# Patient Record
Sex: FEMALE | Race: WHITE | Employment: UNEMPLOYED | URBAN - NONMETROPOLITAN AREA
[De-identification: names, ages, dates, MRNs, and addresses within clinical notes are randomized per-mention and may not be internally consistent; named-entity substitution may affect disease eponyms.]

---

## 2024-01-23 ENCOUNTER — OFFICE VISIT (OUTPATIENT)
Dept: PRIMARY CARE | Facility: CLINIC | Age: 49
End: 2024-01-23
Payer: MEDICAID

## 2024-01-23 VITALS
SYSTOLIC BLOOD PRESSURE: 104 MMHG | WEIGHT: 140 LBS | BODY MASS INDEX: 22.5 KG/M2 | TEMPERATURE: 98.5 F | DIASTOLIC BLOOD PRESSURE: 70 MMHG | HEIGHT: 66 IN | OXYGEN SATURATION: 99 % | HEART RATE: 116 BPM

## 2024-01-23 DIAGNOSIS — F41.9 ANXIETY AND DEPRESSION: Primary | ICD-10-CM

## 2024-01-23 DIAGNOSIS — F32.A ANXIETY AND DEPRESSION: Primary | ICD-10-CM

## 2024-01-23 PROCEDURE — 99204 OFFICE O/P NEW MOD 45 MIN: CPT | Performed by: FAMILY MEDICINE

## 2024-01-23 RX ORDER — BISMUTH SUBSALICYLATE 262 MG
1 TABLET,CHEWABLE ORAL DAILY
COMMUNITY

## 2024-01-23 RX ORDER — DROSPIRENONE AND ETHINYL ESTRADIOL 0.02-3(28)
1 KIT ORAL
COMMUNITY
Start: 2023-05-25

## 2024-01-23 RX ORDER — VENLAFAXINE HYDROCHLORIDE 37.5 MG/1
37.5 CAPSULE, EXTENDED RELEASE ORAL DAILY
Qty: 30 CAPSULE | Refills: 1 | Status: SHIPPED | OUTPATIENT
Start: 2024-01-23 | End: 2024-02-21 | Stop reason: SDUPTHER

## 2024-01-23 RX ORDER — IBUPROFEN 200 MG
1 CAPSULE ORAL DAILY
COMMUNITY

## 2024-01-23 RX ORDER — BUTYROSPERMUM PARKII(SHEA BUTTER), SIMMONDSIA CHINENSIS (JOJOBA) SEED OIL, ALOE BARBADENSIS LEAF EXTRACT .01; 1; 3.5 G/100G; G/100G; G/100G
250 LIQUID TOPICAL 2 TIMES DAILY
COMMUNITY

## 2024-01-23 NOTE — PROGRESS NOTES
"Subjective   Patient ID: Mariah Cole is a 48 y.o. female who presents for Establish Care, Anxiety (Scales completed), and Depression.    HPI SCALES >10 STEFAN AND PHQ >10  SEEING GYNECOLOGY FOR SEVERE CLOTTING PERIODS AND HORMONAL THERAPY  CURRENTLY.  KNOWN FROM COMMUNITY   SEEN LAST 2018    Review of Systems   Constitutional:  Negative for activity change, appetite change, fever and unexpected weight change.   HENT:  Negative for congestion, ear pain, postnasal drip, rhinorrhea, sinus pressure and sore throat.    Eyes:  Negative for discharge, itching and visual disturbance.   Respiratory:  Negative for cough, shortness of breath and wheezing.    Cardiovascular:  Negative for chest pain, palpitations and leg swelling.   Gastrointestinal:  Negative for abdominal pain, blood in stool, constipation, diarrhea, nausea and vomiting.   Endocrine: Negative for cold intolerance, heat intolerance and polyuria.   Genitourinary:  Positive for menstrual problem. Negative for dysuria, flank pain and hematuria.        SHE IS ON VITALY FOR HEAVY CLOTTING PERIODS AND THIS REALLY HELPS   WHILE IT IS INDICATED  FOR PMS DYSPHORIC DISORDER IT COULD ALSO CAUSE DEPRESSION    Musculoskeletal:  Negative for arthralgias, gait problem, joint swelling and myalgias.   Skin:  Negative for rash.   Allergic/Immunologic: Negative for environmental allergies and food allergies.   Neurological:  Negative for dizziness, syncope, weakness, light-headedness, numbness and headaches.   Psychiatric/Behavioral:  Positive for decreased concentration, dysphoric mood and sleep disturbance. The patient is nervous/anxious.        Objective   /70   Pulse (!) 116   Temp 36.9 °C (98.5 °F)   Ht 1.664 m (5' 5.5\")   Wt 63.5 kg (140 lb)   SpO2 99%   BMI 22.94 kg/m²     Physical Exam  Vitals and nursing note reviewed.   Constitutional:       Appearance: Normal appearance.   HENT:      Head: Normocephalic.      Mouth/Throat:      Mouth: Mucous membranes are " "moist.   Cardiovascular:      Rate and Rhythm: Normal rate and regular rhythm.      Pulses: Normal pulses.      Heart sounds: Normal heart sounds. No murmur heard.     No friction rub. No gallop.   Pulmonary:      Effort: Pulmonary effort is normal. No respiratory distress.      Breath sounds: Normal breath sounds. No wheezing.   Abdominal:      General: Bowel sounds are normal. There is no distension.      Palpations: Abdomen is soft.      Tenderness: There is no abdominal tenderness.   Musculoskeletal:         General: No deformity.   Skin:     General: Skin is warm and dry.      Capillary Refill: Capillary refill takes less than 2 seconds.   Neurological:      General: No focal deficit present.      Mental Status: She is alert and oriented to person, place, and time.   Psychiatric:      Comments: SHORTLY INTO INTERVIEW AND EXAM PATIENT BEGAN TO CRY AND HAD RUSH OF SPEECH   DOES NOT KNOW IF SHE NEEDS PSYCHIATRY   KNOW THAT SHE NEEDS HELPS    IS VERY SUPPORTIVE   >10 AND BOTH STEFAN AND PHQ   WILL ACCEPT MEDICATION AND SHORT TERM FOLLOW UP   SHE SEEMS HAPPY \"TO GET THINGS OFF HER CHEST\"           Assessment/Plan   Diagnoses and all orders for this visit:  Anxiety and depression         "

## 2024-01-28 ASSESSMENT — ENCOUNTER SYMPTOMS
ABDOMINAL PAIN: 0
RHINORRHEA: 0
PALPITATIONS: 0
CONSTIPATION: 0
SINUS PRESSURE: 0
HEADACHES: 0
ACTIVITY CHANGE: 0
SORE THROAT: 0
DYSPHORIC MOOD: 1
UNEXPECTED WEIGHT CHANGE: 0
EYE ITCHING: 0
DIZZINESS: 0
HEMATURIA: 0
EYE DISCHARGE: 0
SLEEP DISTURBANCE: 1
DECREASED CONCENTRATION: 1
SHORTNESS OF BREATH: 0
APPETITE CHANGE: 0
WEAKNESS: 0
BLOOD IN STOOL: 0
JOINT SWELLING: 0
NERVOUS/ANXIOUS: 1
DIARRHEA: 0
COUGH: 0
NUMBNESS: 0
LIGHT-HEADEDNESS: 0
VOMITING: 0
NAUSEA: 0
WHEEZING: 0
DYSURIA: 0
MYALGIAS: 0
FLANK PAIN: 0
ARTHRALGIAS: 0
FEVER: 0

## 2024-02-06 ENCOUNTER — OFFICE VISIT (OUTPATIENT)
Dept: PRIMARY CARE | Facility: CLINIC | Age: 49
End: 2024-02-06
Payer: MEDICAID

## 2024-02-06 VITALS
TEMPERATURE: 98.6 F | HEART RATE: 88 BPM | WEIGHT: 139 LBS | OXYGEN SATURATION: 98 % | DIASTOLIC BLOOD PRESSURE: 60 MMHG | SYSTOLIC BLOOD PRESSURE: 112 MMHG | BODY MASS INDEX: 22.78 KG/M2

## 2024-02-06 DIAGNOSIS — F32.A ANXIETY AND DEPRESSION: Primary | ICD-10-CM

## 2024-02-06 DIAGNOSIS — F41.9 ANXIETY AND DEPRESSION: Primary | ICD-10-CM

## 2024-02-06 PROCEDURE — 99214 OFFICE O/P EST MOD 30 MIN: CPT | Performed by: FAMILY MEDICINE

## 2024-02-06 PROCEDURE — 96127 BRIEF EMOTIONAL/BEHAV ASSMT: CPT | Performed by: FAMILY MEDICINE

## 2024-02-06 NOTE — PATIENT INSTRUCTIONS
SEE IN TWO WEEKS   CONSIDER INCREASING THE MEDICATION THEN   CONSIDER TALKING ABOUT SEEING MENTAL HEALTH

## 2024-02-06 NOTE — PROGRESS NOTES
Subjective   Patient ID: Mariah Cole is a 48 y.o. female who presents for Anxiety (Med started 2 weeks ago.  She states that it has helped her sleep better and over all she does feel an improvement. ).    Anxiety  Symptoms include nervous/anxious behavior.        tolerating the medication and she is feeling some better   FEELING LESS ANGRY     Review of Systems   Psychiatric/Behavioral:  Positive for agitation, behavioral problems, dysphoric mood and sleep disturbance. The patient is nervous/anxious.         PATIENT WITH  WHO IS SUPPORTIVE  SHE SEEMS MANIC TODAY NO SO DEPRESSED OR CRYING       NO SINCE RECENT VISIT NO INTERVAL PHYSICAL CHANGES IN ANY OF THE 12 SYSTEMS REVIEWED OTHER THAN THE PSYCHIATRIC LIKE DISCUSSED     Objective   /60   Pulse 88   Temp 37 °C (98.6 °F)   Wt 63 kg (139 lb)   SpO2 98%   BMI 22.78 kg/m²     Physical Exam  Vitals and nursing note reviewed.   Constitutional:       Appearance: Normal appearance.   HENT:      Head: Normocephalic.      Mouth/Throat:      Mouth: Mucous membranes are moist.   Cardiovascular:      Rate and Rhythm: Normal rate and regular rhythm.      Pulses: Normal pulses.      Heart sounds: Normal heart sounds. No murmur heard.     No friction rub. No gallop.   Pulmonary:      Effort: Pulmonary effort is normal. No respiratory distress.      Breath sounds: Normal breath sounds. No wheezing.   Abdominal:      General: Bowel sounds are normal. There is no distension.      Palpations: Abdomen is soft.      Tenderness: There is no abdominal tenderness.   Musculoskeletal:         General: No deformity. Normal range of motion.   Skin:     General: Skin is warm and dry.      Capillary Refill: Capillary refill takes less than 2 seconds.   Neurological:      General: No focal deficit present.      Mental Status: She is alert and oriented to person, place, and time.   Psychiatric:      Comments: ANXIOUS AFFECT AND MOOD, MANIC WITH RUN ON SENTENCES AND SWITCHING  TOPICS  APOLOGIZING CONSTANTLY   WITH THIS RECENT SECOND VISIT IT IS MORE APPARENT THAT PATIENT HAS SOME DEEP SEATED ISSUES AND SHE STATES THAT SHE RUMINATES ON WHAT HAS HAPPENED IN THE PAST    HER WEIGHT IS STABLE  SHE IS SLEEPING NOW WITH THE MEDICATION   STILL DEFERS SEEING A MENTAL HEALTH PROFESSIONAL AT THIS TIME.   SHE IS NOT  SUICIDAL OR HOMICIDAL   SHE EXPRESSES AGORAPHOBIC IDEAS   STEFAN IS STIL >15 AND PHQ ALSO      SINCE RECENT VISIT NO INTERVAL PHYSICAL CHANGES IN ANY OF THE 12 SYSTEMS REVIEWED OTHER THAN PSYCHIATRIC LIKE DISCUSSED     Assessment/Plan   Diagnoses and all orders for this visit:  Anxiety and depression

## 2024-02-08 ASSESSMENT — ENCOUNTER SYMPTOMS
DYSPHORIC MOOD: 1
AGITATION: 1
SLEEP DISTURBANCE: 1
NERVOUS/ANXIOUS: 1

## 2024-02-21 ENCOUNTER — OFFICE VISIT (OUTPATIENT)
Dept: PRIMARY CARE | Facility: CLINIC | Age: 49
End: 2024-02-21
Payer: MEDICAID

## 2024-02-21 VITALS
BODY MASS INDEX: 23.27 KG/M2 | DIASTOLIC BLOOD PRESSURE: 60 MMHG | SYSTOLIC BLOOD PRESSURE: 110 MMHG | HEART RATE: 100 BPM | OXYGEN SATURATION: 98 % | TEMPERATURE: 98.1 F | WEIGHT: 142 LBS

## 2024-02-21 DIAGNOSIS — F41.9 ANXIETY AND DEPRESSION: ICD-10-CM

## 2024-02-21 DIAGNOSIS — F32.A ANXIETY AND DEPRESSION: ICD-10-CM

## 2024-02-21 PROCEDURE — 99214 OFFICE O/P EST MOD 30 MIN: CPT | Performed by: FAMILY MEDICINE

## 2024-02-21 PROCEDURE — 96127 BRIEF EMOTIONAL/BEHAV ASSMT: CPT | Performed by: FAMILY MEDICINE

## 2024-02-21 RX ORDER — VENLAFAXINE HYDROCHLORIDE 75 MG/1
75 CAPSULE, EXTENDED RELEASE ORAL DAILY
Qty: 30 CAPSULE | Refills: 3 | Status: SHIPPED | OUTPATIENT
Start: 2024-02-21 | End: 2024-05-22

## 2024-02-21 NOTE — PROGRESS NOTES
Subjective   Patient ID: Mariah Cole is a 48 y.o. female who presents for Anxiety (Scales completed.  Mariah states she is feeling a lot better.).    Anxiety  Symptoms include nervous/anxious behavior. Patient reports no chest pain, dizziness, nausea, palpitations or shortness of breath.            Review of Systems   Constitutional:  Negative for activity change, appetite change, fever and unexpected weight change.   HENT:  Negative for congestion, ear pain, postnasal drip, rhinorrhea, sinus pressure and sore throat.    Eyes:  Negative for discharge, itching and visual disturbance.   Respiratory:  Negative for cough, shortness of breath and wheezing.    Cardiovascular:  Negative for chest pain, palpitations and leg swelling.   Gastrointestinal:  Positive for constipation. Negative for abdominal pain, blood in stool, diarrhea, nausea and vomiting.        SINCE THE MEDICATION    Endocrine: Negative for cold intolerance, heat intolerance and polyuria.   Genitourinary:  Negative for dysuria, flank pain and hematuria.   Musculoskeletal:  Negative for arthralgias, gait problem, joint swelling and myalgias.   Skin:  Negative for rash.   Allergic/Immunologic: Negative for environmental allergies and food allergies.   Neurological:  Negative for dizziness, syncope, weakness, light-headedness, numbness and headaches.   Psychiatric/Behavioral:  Negative for dysphoric mood and sleep disturbance. The patient is nervous/anxious.        Objective   /60   Pulse 100   Temp 36.7 °C (98.1 °F)   Wt 64.4 kg (142 lb)   SpO2 98%   BMI 23.27 kg/m²     Physical Exam  Vitals and nursing note reviewed.   Constitutional:       Appearance: Normal appearance.   HENT:      Head: Normocephalic.      Mouth/Throat:      Mouth: Mucous membranes are moist.   Cardiovascular:      Rate and Rhythm: Normal rate and regular rhythm.      Pulses: Normal pulses.      Heart sounds: Normal heart sounds. No murmur heard.     No friction rub. No  gallop.   Pulmonary:      Effort: Pulmonary effort is normal. No respiratory distress.      Breath sounds: Normal breath sounds. No wheezing.   Abdominal:      General: Bowel sounds are normal. There is no distension.      Palpations: Abdomen is soft.      Tenderness: There is no abdominal tenderness.   Musculoskeletal:         General: No deformity. Normal range of motion.   Skin:     General: Skin is warm and dry.      Capillary Refill: Capillary refill takes less than 2 seconds.   Neurological:      General: No focal deficit present.      Mental Status: She is alert and oriented to person, place, and time.   Psychiatric:      Comments: 9 STEFAN AND PHQ IS 5 OR SO  PATIENT MORE CONVERSIVE AND IN FULL SENTENCES WITH ONLY A FEW OUTBURSTS SHE DID NOT CRY   STILL DOES NOT WANT TO SEE A COUNSELOR   HER GROWN DAUGHTER IS HOME AND SPENDING TIME WITH HER          Assessment/Plan   VDiagnoses and all orders for this visit:  Anxiety and depression  -     venlafaxine XR (Effexor-XR) 75 mg 24 hr capsule; Take 1 capsule (75 mg) by mouth once daily. Do not crush or chew.

## 2024-02-22 ASSESSMENT — ENCOUNTER SYMPTOMS
FEVER: 0
JOINT SWELLING: 0
ABDOMINAL PAIN: 0
WEAKNESS: 0
DYSURIA: 0
WHEEZING: 0
NAUSEA: 0
DYSPHORIC MOOD: 0
HEADACHES: 0
DIARRHEA: 0
BLOOD IN STOOL: 0
ARTHRALGIAS: 0
EYE DISCHARGE: 0
NERVOUS/ANXIOUS: 1
FLANK PAIN: 0
EYE ITCHING: 0
PALPITATIONS: 0
COUGH: 0
APPETITE CHANGE: 0
HEMATURIA: 0
SINUS PRESSURE: 0
SLEEP DISTURBANCE: 0
ACTIVITY CHANGE: 0
SHORTNESS OF BREATH: 0
VOMITING: 0
SORE THROAT: 0
UNEXPECTED WEIGHT CHANGE: 0
DIZZINESS: 0
RHINORRHEA: 0
LIGHT-HEADEDNESS: 0
NUMBNESS: 0
MYALGIAS: 0
CONSTIPATION: 1

## 2024-03-19 ENCOUNTER — OFFICE VISIT (OUTPATIENT)
Dept: PRIMARY CARE | Facility: CLINIC | Age: 49
End: 2024-03-19
Payer: MEDICAID

## 2024-03-19 VITALS
OXYGEN SATURATION: 99 % | BODY MASS INDEX: 23.76 KG/M2 | TEMPERATURE: 97.9 F | HEART RATE: 115 BPM | WEIGHT: 145 LBS | DIASTOLIC BLOOD PRESSURE: 60 MMHG | SYSTOLIC BLOOD PRESSURE: 108 MMHG

## 2024-03-19 DIAGNOSIS — F32.A ANXIETY AND DEPRESSION: Primary | ICD-10-CM

## 2024-03-19 DIAGNOSIS — Z72.820 POOR SLEEP: ICD-10-CM

## 2024-03-19 DIAGNOSIS — F41.9 ANXIETY AND DEPRESSION: Primary | ICD-10-CM

## 2024-03-19 PROCEDURE — 99214 OFFICE O/P EST MOD 30 MIN: CPT | Performed by: FAMILY MEDICINE

## 2024-03-19 PROCEDURE — 96127 BRIEF EMOTIONAL/BEHAV ASSMT: CPT | Performed by: FAMILY MEDICINE

## 2024-03-19 ASSESSMENT — ENCOUNTER SYMPTOMS
RHINORRHEA: 0
DYSPHORIC MOOD: 1
SINUS PRESSURE: 0
JOINT SWELLING: 0
CONSTIPATION: 0
NAUSEA: 0
PALPITATIONS: 0
SHORTNESS OF BREATH: 0
DECREASED CONCENTRATION: 1
HEMATURIA: 0
LIGHT-HEADEDNESS: 0
APPETITE CHANGE: 0
ABDOMINAL PAIN: 0
DIARRHEA: 0
WHEEZING: 0
COUGH: 0
UNEXPECTED WEIGHT CHANGE: 0
NERVOUS/ANXIOUS: 0
MYALGIAS: 0
DYSURIA: 0
HEADACHES: 0
NUMBNESS: 0
SORE THROAT: 0
VOMITING: 0
EYE DISCHARGE: 0
ARTHRALGIAS: 0
DIZZINESS: 0
BLOOD IN STOOL: 0
FEVER: 0
FLANK PAIN: 0
SLEEP DISTURBANCE: 0
EYE ITCHING: 0
WEAKNESS: 0
ACTIVITY CHANGE: 0

## 2024-03-19 NOTE — PROGRESS NOTES
Subjective   Patient ID: Mariah Cole is a 48 y.o. female who presents for Depression (Scales completed) and Anxiety.    HPI POOR SLEEP   SAW GYNECOLOGY WHO DID NOT WANT TO START ESTROGEN     Review of Systems   Constitutional:  Negative for activity change, appetite change, fever and unexpected weight change.   HENT:  Negative for congestion, ear pain, postnasal drip, rhinorrhea, sinus pressure and sore throat.    Eyes:  Negative for discharge, itching and visual disturbance.   Respiratory:  Negative for cough, shortness of breath and wheezing.    Cardiovascular:  Negative for chest pain, palpitations and leg swelling.   Gastrointestinal:  Negative for abdominal pain, blood in stool, constipation, diarrhea, nausea and vomiting.   Endocrine: Negative for cold intolerance, heat intolerance and polyuria.        Night sweats and hot flashes    Genitourinary:  Negative for dysuria, flank pain and hematuria.   Musculoskeletal:  Negative for arthralgias, gait problem, joint swelling and myalgias.   Skin:  Negative for rash.   Allergic/Immunologic: Negative for environmental allergies and food allergies.   Neurological:  Negative for dizziness, syncope, weakness, light-headedness, numbness and headaches.   Psychiatric/Behavioral:  Positive for decreased concentration and dysphoric mood. Negative for sleep disturbance. The patient is not nervous/anxious.         SOME HYPERACTIVITY   RUSH OF SPEECH        Objective   /60   Pulse (!) 115   Temp 36.6 °C (97.9 °F)   Wt 65.8 kg (145 lb)   SpO2 99%   BMI 23.76 kg/m²     Physical Exam  Vitals and nursing note reviewed.   Constitutional:       Appearance: Normal appearance.   HENT:      Head: Normocephalic.      Mouth/Throat:      Mouth: Mucous membranes are moist.   Cardiovascular:      Rate and Rhythm: Normal rate and regular rhythm.      Pulses: Normal pulses.      Heart sounds: Normal heart sounds. No murmur heard.     No friction rub. No gallop.   Pulmonary:       Effort: Pulmonary effort is normal. No respiratory distress.      Breath sounds: Normal breath sounds. No wheezing.   Abdominal:      General: Bowel sounds are normal. There is no distension.      Palpations: Abdomen is soft.      Tenderness: There is no abdominal tenderness.   Musculoskeletal:         General: No deformity. Normal range of motion.   Skin:     General: Skin is warm and dry.      Capillary Refill: Capillary refill takes less than 2 seconds.   Neurological:      General: No focal deficit present.      Mental Status: She is alert and oriented to person, place, and time.   Psychiatric:         Mood and Affect: Mood normal.      Comments: STEFAN IS 7  PHQ IS 9   PATIENT FEELS BETTER AND NOT TEARFUL TODAY  SOME PSYCHOMOTOR HYPERACTIVITY        Assessment/Plan   Diagnoses and all orders for this visit:  Anxiety and depression  Poor sleep

## 2024-03-19 NOTE — PATIENT INSTRUCTIONS
BL[facility]OS[facility]  SOY NUTS AND TOFU   THESE HAVE NATURAL ESTROGENS     CONTINUE THE CHIROPRACTIC CARE

## 2024-03-22 ENCOUNTER — APPOINTMENT (OUTPATIENT)
Dept: PRIMARY CARE | Facility: CLINIC | Age: 49
End: 2024-03-22
Payer: MEDICAID

## 2024-04-30 ENCOUNTER — OFFICE VISIT (OUTPATIENT)
Dept: PRIMARY CARE | Facility: CLINIC | Age: 49
End: 2024-04-30
Payer: MEDICAID

## 2024-04-30 VITALS
BODY MASS INDEX: 23.93 KG/M2 | SYSTOLIC BLOOD PRESSURE: 114 MMHG | OXYGEN SATURATION: 99 % | TEMPERATURE: 98.9 F | WEIGHT: 146 LBS | DIASTOLIC BLOOD PRESSURE: 62 MMHG | HEART RATE: 103 BPM

## 2024-04-30 DIAGNOSIS — F41.9 ANXIETY AND DEPRESSION: Primary | ICD-10-CM

## 2024-04-30 DIAGNOSIS — F32.A ANXIETY AND DEPRESSION: Primary | ICD-10-CM

## 2024-04-30 DIAGNOSIS — Z72.820 POOR SLEEP: ICD-10-CM

## 2024-04-30 PROCEDURE — 99214 OFFICE O/P EST MOD 30 MIN: CPT | Performed by: FAMILY MEDICINE

## 2024-04-30 RX ORDER — DIVALPROEX SODIUM 250 MG/1
250 TABLET, DELAYED RELEASE ORAL NIGHTLY
Qty: 90 TABLET | Refills: 3 | Status: SHIPPED | OUTPATIENT
Start: 2024-04-30 | End: 2024-05-29 | Stop reason: WASHOUT

## 2024-04-30 ASSESSMENT — ENCOUNTER SYMPTOMS
DIARRHEA: 0
EYE ITCHING: 0
BLOOD IN STOOL: 0
UNEXPECTED WEIGHT CHANGE: 0
DYSPHORIC MOOD: 1
HEMATURIA: 0
ABDOMINAL PAIN: 0
HEADACHES: 0
NERVOUS/ANXIOUS: 1
FEVER: 0
FLANK PAIN: 0
APPETITE CHANGE: 0
SLEEP DISTURBANCE: 1
MYALGIAS: 0
VOMITING: 0
DIZZINESS: 0
LIGHT-HEADEDNESS: 0
DECREASED CONCENTRATION: 1
SORE THROAT: 0
RHINORRHEA: 0
AGITATION: 1
COUGH: 0
PALPITATIONS: 0
ARTHRALGIAS: 0
NAUSEA: 0
WEAKNESS: 0
JOINT SWELLING: 0
NUMBNESS: 0
DYSURIA: 0
SINUS PRESSURE: 0
WHEEZING: 0
SHORTNESS OF BREATH: 0
ACTIVITY CHANGE: 0
CONSTIPATION: 0
EYE DISCHARGE: 0

## 2024-04-30 NOTE — PROGRESS NOTES
Subjective   Patient ID: Mariah Cole is a 49 y.o. female who presents for Anxiety (Scales completed).    HPI PRIOR ASSESSMENT STEFAN IS 7 AND PHQ IS 9  TODAY   DEPRESSION SCALE IS UP 10  STEFAN IS STILL 7   SLEEP IS NOT GOOD     Review of Systems   Constitutional:  Negative for activity change, appetite change, fever and unexpected weight change.   HENT:  Negative for congestion, ear pain, postnasal drip, rhinorrhea, sinus pressure and sore throat.    Eyes:  Negative for discharge, itching and visual disturbance.   Respiratory:  Negative for cough, shortness of breath and wheezing.    Cardiovascular:  Negative for chest pain, palpitations and leg swelling.   Gastrointestinal:  Negative for abdominal pain, blood in stool, constipation, diarrhea, nausea and vomiting.   Endocrine: Negative for cold intolerance, heat intolerance and polyuria.   Genitourinary:  Negative for dysuria, flank pain and hematuria.        NO LIBIDO   PERIODS ARE BACK TO NORMAL ON THE BCP   STOOLS BACK TO NORMAL    Musculoskeletal:  Negative for arthralgias, gait problem, joint swelling and myalgias.   Skin:  Negative for rash.   Allergic/Immunologic: Negative for environmental allergies and food allergies.   Neurological:  Negative for dizziness, syncope, weakness, light-headedness, numbness and headaches.   Psychiatric/Behavioral:  Positive for agitation, decreased concentration, dysphoric mood and sleep disturbance. The patient is nervous/anxious.        Objective   /62   Pulse 103   Temp 37.2 °C (98.9 °F)   Wt 66.2 kg (146 lb)   SpO2 99%   BMI 23.93 kg/m²     Physical Exam  Vitals and nursing note reviewed.   Constitutional:       Appearance: Normal appearance.   HENT:      Head: Normocephalic.      Mouth/Throat:      Mouth: Mucous membranes are moist.   Cardiovascular:      Rate and Rhythm: Normal rate and regular rhythm.      Pulses: Normal pulses.      Heart sounds: Normal heart sounds. No murmur heard.     No friction rub. No  gallop.   Pulmonary:      Effort: Pulmonary effort is normal. No respiratory distress.      Breath sounds: Normal breath sounds. No wheezing.   Abdominal:      General: Bowel sounds are normal. There is no distension.      Palpations: Abdomen is soft.      Tenderness: There is no abdominal tenderness.   Genitourinary:     Comments: NIGHT TIME HOT FLASHES   Musculoskeletal:         General: No deformity. Normal range of motion.   Skin:     General: Skin is warm and dry.      Capillary Refill: Capillary refill takes less than 2 seconds.   Neurological:      General: No focal deficit present.      Mental Status: She is alert and oriented to person, place, and time.   Psychiatric:      Comments: STEFAN IS 7   PHQ IS 10   VERY ANIMATED            Assessment/Plan   Diagnoses and all orders for this visit:  Anxiety and depression  -     divalproex (Depakote) 250 mg EC tablet; Take 1 tablet (250 mg) by mouth once daily at bedtime. Do not crush, chew, or split.  Poor sleep  -     divalproex (Depakote) 250 mg EC tablet; Take 1 tablet (250 mg) by mouth once daily at bedtime. Do not crush, chew, or split.

## 2024-05-21 DIAGNOSIS — F41.9 ANXIETY AND DEPRESSION: ICD-10-CM

## 2024-05-21 DIAGNOSIS — F32.A ANXIETY AND DEPRESSION: ICD-10-CM

## 2024-05-22 RX ORDER — VENLAFAXINE HYDROCHLORIDE 75 MG/1
75 CAPSULE, EXTENDED RELEASE ORAL DAILY
Qty: 30 CAPSULE | Refills: 3 | Status: SHIPPED | OUTPATIENT
Start: 2024-05-22 | End: 2024-09-19

## 2024-05-29 ENCOUNTER — OFFICE VISIT (OUTPATIENT)
Dept: PRIMARY CARE | Facility: CLINIC | Age: 49
End: 2024-05-29
Payer: MEDICAID

## 2024-05-29 VITALS
OXYGEN SATURATION: 100 % | SYSTOLIC BLOOD PRESSURE: 126 MMHG | HEART RATE: 61 BPM | WEIGHT: 148 LBS | BODY MASS INDEX: 24.25 KG/M2 | DIASTOLIC BLOOD PRESSURE: 70 MMHG | TEMPERATURE: 96.5 F

## 2024-05-29 DIAGNOSIS — F90.2 ATTENTION DEFICIT HYPERACTIVITY DISORDER (ADHD), COMBINED TYPE: Primary | ICD-10-CM

## 2024-05-29 DIAGNOSIS — F41.9 ANXIETY AND DEPRESSION: ICD-10-CM

## 2024-05-29 DIAGNOSIS — N95.8 OTHER SPECIFIED MENOPAUSAL AND PERIMENOPAUSAL DISORDERS: ICD-10-CM

## 2024-05-29 DIAGNOSIS — Z72.820 POOR SLEEP: ICD-10-CM

## 2024-05-29 DIAGNOSIS — F32.A ANXIETY AND DEPRESSION: ICD-10-CM

## 2024-05-29 PROCEDURE — 99214 OFFICE O/P EST MOD 30 MIN: CPT | Performed by: FAMILY MEDICINE

## 2024-05-29 RX ORDER — BUPROPION HYDROCHLORIDE 150 MG/1
150 TABLET ORAL EVERY MORNING
Qty: 30 TABLET | Refills: 5 | Status: SHIPPED | OUTPATIENT
Start: 2024-05-29 | End: 2024-11-25

## 2024-05-29 ASSESSMENT — ENCOUNTER SYMPTOMS
PALPITATIONS: 0
ACTIVITY CHANGE: 0
UNEXPECTED WEIGHT CHANGE: 0
FLANK PAIN: 0
LIGHT-HEADEDNESS: 0
SORE THROAT: 0
NUMBNESS: 0
BLOOD IN STOOL: 0
NAUSEA: 0
ARTHRALGIAS: 0
DIARRHEA: 0
VOMITING: 0
JOINT SWELLING: 0
RHINORRHEA: 0
WHEEZING: 0
SHORTNESS OF BREATH: 0
SLEEP DISTURBANCE: 1
CONSTIPATION: 0
DIZZINESS: 0
MYALGIAS: 0
ABDOMINAL PAIN: 0
NERVOUS/ANXIOUS: 1
HEADACHES: 0
FEVER: 0
COUGH: 0
WEAKNESS: 0
DYSURIA: 0
DYSPHORIC MOOD: 1
SINUS PRESSURE: 0
EYE ITCHING: 0
HEMATURIA: 0
EYE DISCHARGE: 0
APPETITE CHANGE: 0

## 2024-05-29 NOTE — PROGRESS NOTES
Subjective   Patient ID: Mariah Cole is a 49 y.o. female who presents for Follow-up (Sleep- better./Would like to discuss hormones, ADHD, and other medications: Wellbutrin and Mirtazapine.).    HPI PATIENT HAVING DIFFICULT TIME THROUGH HER MENOPAUSAL YEARS/DRENCHING NIGHT SWEATS AND HOT FLASHES   IS ON HORMONAL THERAPY   FOLLOWS WITH GYNECOLOGY /LAST VISIT IN 3-24 AND SHE RECOMMENDED AT LEAST 150 OF THE EFFEXOR   APPEARS TO BE SLEEPING BETTER   LABS WERE DONE EARLIER AND ARE GOOD /THYROID AND CBC   5 YEARS AGO SOME HORMONAL VALUES WERE DONE AND WERE PRE MENOPAUSAL LEVELS FSH AND LH AND ESTRADIOL  I SAW HER THEN WITH VOMITING OF NOTE DUB AND THAT'S WHY HORMONAL LEVELS WERE DONE    DID NOT TAKE THE DEPAKOTE     Review of Systems   Constitutional:  Negative for activity change, appetite change, fever and unexpected weight change.   HENT:  Negative for congestion, ear pain, postnasal drip, rhinorrhea, sinus pressure and sore throat.    Eyes:  Negative for discharge, itching and visual disturbance.   Respiratory:  Negative for cough, shortness of breath and wheezing.    Cardiovascular:  Negative for chest pain, palpitations and leg swelling.   Gastrointestinal:  Negative for abdominal pain, blood in stool, constipation, diarrhea, nausea and vomiting.   Endocrine: Negative for cold intolerance, heat intolerance and polyuria.   Genitourinary:  Negative for dysuria, flank pain and hematuria.   Musculoskeletal:  Negative for arthralgias, gait problem, joint swelling and myalgias.   Skin:  Negative for rash.   Allergic/Immunologic: Negative for environmental allergies and food allergies.   Neurological:  Negative for dizziness, syncope, weakness, light-headedness, numbness and headaches.   Psychiatric/Behavioral:  Positive for dysphoric mood and sleep disturbance. The patient is nervous/anxious.         FEELS GOOD WITH THE EFFEXOR DOSE   MAYBE ADD REMERON OR WELLBUTRIN        Objective   /70   Pulse 61   Temp 35.8  °C (96.5 °F)   Wt 67.1 kg (148 lb)   SpO2 100%   BMI 24.25 kg/m²     Physical Exam  Vitals and nursing note reviewed.   Constitutional:       Appearance: Normal appearance.   HENT:      Head: Normocephalic.      Mouth/Throat:      Mouth: Mucous membranes are moist.   Cardiovascular:      Rate and Rhythm: Normal rate and regular rhythm.      Pulses: Normal pulses.      Heart sounds: Normal heart sounds. No murmur heard.     No friction rub. No gallop.   Pulmonary:      Effort: Pulmonary effort is normal. No respiratory distress.      Breath sounds: Normal breath sounds. No wheezing.   Abdominal:      General: Bowel sounds are normal. There is no distension.      Palpations: Abdomen is soft.      Tenderness: There is no abdominal tenderness.   Musculoskeletal:         General: No deformity. Normal range of motion.   Skin:     General: Skin is warm and dry.      Capillary Refill: Capillary refill takes less than 2 seconds.   Neurological:      General: No focal deficit present.      Mental Status: She is alert and oriented to person, place, and time.      Comments: FEELS LIKE HAS ADHD   INABILITY TO FOCUS    Psychiatric:         Mood and Affect: Mood normal.      Comments: PATIENT WITH RUSH AND PRESSURED SPEECH   HYPERACTIVE          Assessment/Plan   Problem List Items Addressed This Visit             ICD-10-CM    Anxiety and depression F41.9, F32.A    Poor sleep Z72.820    Attention deficit hyperactivity disorder (ADHD), combined type - Primary F90.2    Relevant Medications    buPROPion XL (Wellbutrin XL) 150 mg 24 hr tablet    Other specified menopausal and perimenopausal disorders N95.8

## 2024-06-26 ENCOUNTER — APPOINTMENT (OUTPATIENT)
Dept: PRIMARY CARE | Facility: CLINIC | Age: 49
End: 2024-06-26
Payer: MEDICAID

## 2024-06-26 VITALS
OXYGEN SATURATION: 98 % | BODY MASS INDEX: 24.25 KG/M2 | HEART RATE: 81 BPM | WEIGHT: 148 LBS | SYSTOLIC BLOOD PRESSURE: 114 MMHG | TEMPERATURE: 97.6 F | DIASTOLIC BLOOD PRESSURE: 62 MMHG

## 2024-06-26 DIAGNOSIS — F41.9 ANXIETY AND DEPRESSION: ICD-10-CM

## 2024-06-26 DIAGNOSIS — F90.2 ATTENTION DEFICIT HYPERACTIVITY DISORDER (ADHD), COMBINED TYPE: Primary | ICD-10-CM

## 2024-06-26 DIAGNOSIS — Z72.820 POOR SLEEP: ICD-10-CM

## 2024-06-26 DIAGNOSIS — F32.A ANXIETY AND DEPRESSION: ICD-10-CM

## 2024-06-26 PROCEDURE — 99214 OFFICE O/P EST MOD 30 MIN: CPT | Performed by: FAMILY MEDICINE

## 2024-06-26 ASSESSMENT — ENCOUNTER SYMPTOMS
UNEXPECTED WEIGHT CHANGE: 0
SHORTNESS OF BREATH: 0
EYE DISCHARGE: 0
MYALGIAS: 0
ABDOMINAL PAIN: 0
DIZZINESS: 0
NUMBNESS: 0
PALPITATIONS: 0
DIARRHEA: 0
RHINORRHEA: 0
LIGHT-HEADEDNESS: 0
SINUS PRESSURE: 0
WEAKNESS: 0
DYSURIA: 0
COUGH: 0
ACTIVITY CHANGE: 0
JOINT SWELLING: 0
HEMATURIA: 0
FEVER: 0
HEADACHES: 0
BLOOD IN STOOL: 0
VOMITING: 0
ARTHRALGIAS: 0
DYSPHORIC MOOD: 0
WHEEZING: 0
NERVOUS/ANXIOUS: 1
NAUSEA: 0
FLANK PAIN: 0
SLEEP DISTURBANCE: 1
CONSTIPATION: 0
SORE THROAT: 0
EYE ITCHING: 0
APPETITE CHANGE: 0

## 2024-06-26 NOTE — PROGRESS NOTES
Subjective   Patient ID: Mariah Cole is a 49 y.o. female who presents for Follow-up (1 month ADHD/Anxiety- scales completed).    HPI     Review of Systems   Constitutional:  Negative for activity change, appetite change, fever and unexpected weight change.   HENT:  Negative for congestion, ear pain, postnasal drip, rhinorrhea, sinus pressure and sore throat.    Eyes:  Negative for discharge, itching and visual disturbance.   Respiratory:  Negative for cough, shortness of breath and wheezing.    Cardiovascular:  Negative for chest pain, palpitations and leg swelling.   Gastrointestinal:  Negative for abdominal pain, blood in stool, constipation, diarrhea, nausea and vomiting.   Endocrine: Negative for cold intolerance, heat intolerance and polyuria.   Genitourinary:  Negative for dysuria, flank pain and hematuria.   Musculoskeletal:  Negative for arthralgias, gait problem, joint swelling and myalgias.   Skin:  Negative for rash.   Allergic/Immunologic: Negative for environmental allergies and food allergies.   Neurological:  Negative for dizziness, syncope, weakness, light-headedness, numbness and headaches.   Psychiatric/Behavioral:  Positive for sleep disturbance. Negative for dysphoric mood. The patient is nervous/anxious.        Objective   /62   Pulse 81   Temp 36.4 °C (97.6 °F)   Wt 67.1 kg (148 lb)   SpO2 98%   BMI 24.25 kg/m²     Physical Exam  Vitals and nursing note reviewed.   Constitutional:       Appearance: Normal appearance.   HENT:      Head: Normocephalic.      Mouth/Throat:      Mouth: Mucous membranes are moist.   Cardiovascular:      Rate and Rhythm: Normal rate and regular rhythm.      Pulses: Normal pulses.      Heart sounds: Normal heart sounds. No murmur heard.     No friction rub. No gallop.   Pulmonary:      Effort: Pulmonary effort is normal. No respiratory distress.      Breath sounds: Normal breath sounds. No wheezing.   Abdominal:      General: Bowel sounds are normal. There  is no distension.      Palpations: Abdomen is soft.      Tenderness: There is no abdominal tenderness.   Musculoskeletal:         General: No deformity. Normal range of motion.   Skin:     General: Skin is warm and dry.      Capillary Refill: Capillary refill takes less than 2 seconds.   Neurological:      General: No focal deficit present.      Mental Status: She is alert and oriented to person, place, and time.   Psychiatric:         Mood and Affect: Mood normal.      Comments: STEFAN IS 7  PHQ IS 7   FEELING BETTER          Assessment/Plan   Problem List Items Addressed This Visit             ICD-10-CM    Anxiety and depression F41.9, F32.A    Poor sleep Z72.820    Attention deficit hyperactivity disorder (ADHD), combined type - Primary F90.2

## 2024-07-26 ENCOUNTER — APPOINTMENT (OUTPATIENT)
Dept: PRIMARY CARE | Facility: CLINIC | Age: 49
End: 2024-07-26
Payer: MEDICAID

## 2024-07-26 VITALS
OXYGEN SATURATION: 100 % | SYSTOLIC BLOOD PRESSURE: 120 MMHG | DIASTOLIC BLOOD PRESSURE: 60 MMHG | BODY MASS INDEX: 24.58 KG/M2 | WEIGHT: 150 LBS | TEMPERATURE: 98.2 F | HEART RATE: 72 BPM

## 2024-07-26 DIAGNOSIS — F41.9 ANXIETY AND DEPRESSION: Primary | ICD-10-CM

## 2024-07-26 DIAGNOSIS — N95.8 OTHER SPECIFIED MENOPAUSAL AND PERIMENOPAUSAL DISORDERS: ICD-10-CM

## 2024-07-26 DIAGNOSIS — F32.A ANXIETY AND DEPRESSION: Primary | ICD-10-CM

## 2024-07-26 PROCEDURE — 99214 OFFICE O/P EST MOD 30 MIN: CPT | Performed by: FAMILY MEDICINE

## 2024-07-26 ASSESSMENT — ENCOUNTER SYMPTOMS
SORE THROAT: 0
APPETITE CHANGE: 0
PALPITATIONS: 0
EYE ITCHING: 0
NAUSEA: 0
DIARRHEA: 0
WEAKNESS: 0
FLANK PAIN: 0
ABDOMINAL PAIN: 0
NERVOUS/ANXIOUS: 0
UNEXPECTED WEIGHT CHANGE: 0
FEVER: 0
DYSPHORIC MOOD: 0
RHINORRHEA: 0
BLOOD IN STOOL: 0
NUMBNESS: 0
VOMITING: 0
DIZZINESS: 0
ACTIVITY CHANGE: 0
HEADACHES: 0
JOINT SWELLING: 0
COUGH: 0
SINUS PRESSURE: 0
LIGHT-HEADEDNESS: 0
DYSURIA: 0
HEMATURIA: 0
ARTHRALGIAS: 0
WHEEZING: 0
EYE DISCHARGE: 0
CONSTIPATION: 0
SLEEP DISTURBANCE: 1
MYALGIAS: 0
SHORTNESS OF BREATH: 0

## 2024-07-26 NOTE — PROGRESS NOTES
Subjective   Patient ID: Mariah Cole is a 49 y.o. female who presents for Follow-up (1 month mood follow up).    HPI NIGHT SWEATS NOT SO BAD   DAY TIME SWEATING   SHAHEED GALVIN AT [facility] [place]     Review of Systems   Constitutional:  Negative for activity change, appetite change, fever and unexpected weight change.   HENT:  Negative for congestion, ear pain, postnasal drip, rhinorrhea, sinus pressure and sore throat.    Eyes:  Negative for discharge, itching and visual disturbance.   Respiratory:  Negative for cough, shortness of breath and wheezing.    Cardiovascular:  Negative for chest pain, palpitations and leg swelling.   Gastrointestinal:  Negative for abdominal pain, blood in stool, constipation, diarrhea, nausea and vomiting.   Endocrine: Negative for cold intolerance, heat intolerance and polyuria.   Genitourinary:  Negative for dysuria, flank pain and hematuria.        NO PERIODS NOW FOR MONTHS     ESTROGEN IS LOW   FSH NORMAL LOW RANGE    Musculoskeletal:  Negative for arthralgias, gait problem, joint swelling and myalgias.   Skin:  Negative for rash.   Allergic/Immunologic: Negative for environmental allergies and food allergies.   Neurological:  Negative for dizziness, syncope, weakness, light-headedness, numbness and headaches.   Psychiatric/Behavioral:  Positive for sleep disturbance. Negative for dysphoric mood. The patient is not nervous/anxious.        Objective   /60   Pulse 72   Temp 36.8 °C (98.2 °F)   Wt 68 kg (150 lb)   SpO2 100%   BMI 24.58 kg/m²     Physical Exam  Vitals and nursing note reviewed.   Constitutional:       Appearance: Normal appearance.   HENT:      Head: Normocephalic.      Mouth/Throat:      Mouth: Mucous membranes are moist.   Cardiovascular:      Rate and Rhythm: Normal rate and regular rhythm.      Pulses: Normal pulses.      Heart sounds: Normal heart sounds. No murmur heard.     No friction rub. No gallop.   Pulmonary:      Effort: Pulmonary effort is  normal. No respiratory distress.      Breath sounds: Normal breath sounds. No wheezing.   Abdominal:      General: Bowel sounds are normal. There is no distension.      Palpations: Abdomen is soft.      Tenderness: There is no abdominal tenderness.   Musculoskeletal:         General: No deformity. Normal range of motion.   Skin:     General: Skin is warm and dry.      Capillary Refill: Capillary refill takes less than 2 seconds.   Neurological:      General: No focal deficit present.      Mental Status: She is alert and oriented to person, place, and time.   Psychiatric:         Mood and Affect: Mood normal.      Comments: STEFAN AND PHQ 7'S   THIS IS IMPROVED            Assessment/Plan   Problem List Items Addressed This Visit             ICD-10-CM    Anxiety and depression - Primary F41.9, F32.A    Other specified menopausal and perimenopausal disorders N95.8

## 2024-08-23 ENCOUNTER — APPOINTMENT (OUTPATIENT)
Dept: PRIMARY CARE | Facility: CLINIC | Age: 49
End: 2024-08-23
Payer: MEDICAID

## 2024-08-23 VITALS
OXYGEN SATURATION: 99 % | BODY MASS INDEX: 24.91 KG/M2 | DIASTOLIC BLOOD PRESSURE: 68 MMHG | SYSTOLIC BLOOD PRESSURE: 112 MMHG | HEART RATE: 89 BPM | TEMPERATURE: 97.1 F | WEIGHT: 152 LBS

## 2024-08-23 DIAGNOSIS — Z12.31 ENCOUNTER FOR SCREENING MAMMOGRAM FOR MALIGNANT NEOPLASM OF BREAST: ICD-10-CM

## 2024-08-23 DIAGNOSIS — N95.8 OTHER SPECIFIED MENOPAUSAL AND PERIMENOPAUSAL DISORDERS: Primary | ICD-10-CM

## 2024-08-23 PROCEDURE — 99214 OFFICE O/P EST MOD 30 MIN: CPT | Performed by: FAMILY MEDICINE

## 2024-08-23 PROCEDURE — 96127 BRIEF EMOTIONAL/BEHAV ASSMT: CPT | Performed by: FAMILY MEDICINE

## 2024-08-23 NOTE — PROGRESS NOTES
Subjective   Patient ID: Mariah Cole is a 49 y.o. female who presents for Depression (Scales completed.).    HPI UNABLE TO REACH HER GYNECOLOGIST FOR INPUT     Review of SystemsSINCE THE  VISIT NO INTERVAL HISTORICAL CHANGES IN ANY OF THE 12 SYSTEMS REVIEWED OTHER THAN IMPROVED ANXIETY AND DEPRESSION AND BETTER SLEEP BUT NIGHT SWEATS  CONTINUE BUT LESS     Objective   /68   Pulse 89   Temp 36.2 °C (97.1 °F)   Wt 68.9 kg (152 lb)   SpO2 99%   BMI 24.91 kg/m²     Physical Exam  Vitals and nursing note reviewed.   Constitutional:       Appearance: Normal appearance.   HENT:      Head: Normocephalic.   Cardiovascular:      Rate and Rhythm: Normal rate and regular rhythm.      Pulses: Normal pulses.      Heart sounds: Normal heart sounds. No murmur heard.     No friction rub. No gallop.   Pulmonary:      Effort: Pulmonary effort is normal. No respiratory distress.      Breath sounds: Normal breath sounds. No wheezing.   Abdominal:      General: There is no distension.      Tenderness: There is no abdominal tenderness.   Musculoskeletal:         General: No deformity. Normal range of motion.   Skin:     General: Skin is warm and dry.      Capillary Refill: Capillary refill takes less than 2 seconds.   Neurological:      General: No focal deficit present.      Mental Status: She is alert and oriented to person, place, and time.   Psychiatric:      Comments: STEFAN IS 7 AND PHQ IS 11  LAST TIME IT WAS 7 AND 8            Assessment/Plan   Problem List Items Addressed This Visit             ICD-10-CM    Other specified menopausal and perimenopausal disorders - Primary N95.8    Encounter for screening mammogram for malignant neoplasm of breast Z12.31    Relevant Orders    BI mammo bilateral screening tomosynthesis

## 2024-08-30 ENCOUNTER — OFFICE VISIT (OUTPATIENT)
Dept: PRIMARY CARE | Facility: CLINIC | Age: 49
End: 2024-08-30
Payer: MEDICAID

## 2024-08-30 VITALS
SYSTOLIC BLOOD PRESSURE: 120 MMHG | TEMPERATURE: 97.4 F | OXYGEN SATURATION: 98 % | WEIGHT: 152 LBS | HEART RATE: 116 BPM | BODY MASS INDEX: 24.91 KG/M2 | DIASTOLIC BLOOD PRESSURE: 62 MMHG

## 2024-08-30 DIAGNOSIS — N95.8 OTHER SPECIFIED MENOPAUSAL AND PERIMENOPAUSAL DISORDERS: Primary | ICD-10-CM

## 2024-08-30 PROCEDURE — 99213 OFFICE O/P EST LOW 20 MIN: CPT | Performed by: FAMILY MEDICINE

## 2024-09-06 NOTE — PROGRESS NOTES
Subjective   Patient ID: Mariah Cole is a 49 y.o. female who presents for Results.    HPI PATIENT HAS ALREADY STOPPED THE VITALY FOR UNKNOWN TIME  NO BLEEDING   NO WORSE SWEATING     Review of SystemsSINCE THE  VISIT NO INTERVAL HISTORICAL CHANGES IN ANY OF THE 12 SYSTEMS REVIEWED OTHER THAN CONTINUED NIGHT SWEATS     Objective   /62   Pulse (!) 116   Temp 36.3 °C (97.4 °F)   Wt 68.9 kg (152 lb)   SpO2 98%   BMI 24.91 kg/m²     Physical Exam  Vitals and nursing note reviewed.   Constitutional:       Appearance: Normal appearance.   HENT:      Head: Normocephalic.      Mouth/Throat:      Mouth: Mucous membranes are moist.   Cardiovascular:      Rate and Rhythm: Normal rate and regular rhythm.      Pulses: Normal pulses.      Heart sounds: Normal heart sounds. No murmur heard.     No friction rub. No gallop.   Pulmonary:      Effort: Pulmonary effort is normal. No respiratory distress.      Breath sounds: Normal breath sounds. No wheezing.   Abdominal:      General: Bowel sounds are normal. There is no distension.      Palpations: Abdomen is soft.      Tenderness: There is no abdominal tenderness.   Genitourinary:     Comments: NORMAL PELVIC WITH GYNECOLOGY   NORMAL MAMMOGRAM   PERIMENOPAUSAL SWEATING DISORDER   PATIENT DESIRES ESTROGEN REPLACEMENT THERAPY   AGE WISE THIS MAY OR MAY NOT BE HELPFUL TO HER   Musculoskeletal:         General: No deformity. Normal range of motion.   Skin:     General: Skin is warm and dry.      Capillary Refill: Capillary refill takes less than 2 seconds.   Neurological:      General: No focal deficit present.      Mental Status: She is alert and oriented to person, place, and time.   Psychiatric:         Mood and Affect: Mood normal.         Assessment/Plan   Problem List Items Addressed This Visit             ICD-10-CM    Other specified menopausal and perimenopausal disorders - Primary N95.8

## 2024-10-03 DIAGNOSIS — F32.A ANXIETY AND DEPRESSION: ICD-10-CM

## 2024-10-03 DIAGNOSIS — F41.9 ANXIETY AND DEPRESSION: ICD-10-CM

## 2024-10-04 ENCOUNTER — APPOINTMENT (OUTPATIENT)
Dept: PRIMARY CARE | Facility: CLINIC | Age: 49
End: 2024-10-04
Payer: MEDICAID

## 2024-10-04 VITALS
WEIGHT: 152 LBS | BODY MASS INDEX: 24.91 KG/M2 | DIASTOLIC BLOOD PRESSURE: 68 MMHG | SYSTOLIC BLOOD PRESSURE: 106 MMHG | HEART RATE: 92 BPM | TEMPERATURE: 97.8 F | OXYGEN SATURATION: 96 %

## 2024-10-04 DIAGNOSIS — F41.9 ANXIETY AND DEPRESSION: ICD-10-CM

## 2024-10-04 DIAGNOSIS — F32.A ANXIETY AND DEPRESSION: ICD-10-CM

## 2024-10-04 DIAGNOSIS — N95.1 PERIMENOPAUSAL SYMPTOMS: Primary | ICD-10-CM

## 2024-10-04 PROCEDURE — 99214 OFFICE O/P EST MOD 30 MIN: CPT | Performed by: FAMILY MEDICINE

## 2024-10-04 PROCEDURE — 96127 BRIEF EMOTIONAL/BEHAV ASSMT: CPT | Performed by: FAMILY MEDICINE

## 2024-10-04 RX ORDER — ESTRADIOL 0.5 MG/1
0.5 TABLET ORAL DAILY
Qty: 30 TABLET | Refills: 11 | Status: SHIPPED | OUTPATIENT
Start: 2024-10-04 | End: 2025-10-04

## 2024-10-04 RX ORDER — VENLAFAXINE HYDROCHLORIDE 75 MG/1
75 CAPSULE, EXTENDED RELEASE ORAL DAILY
Qty: 30 CAPSULE | Refills: 3 | Status: SHIPPED | OUTPATIENT
Start: 2024-10-04 | End: 2025-02-01

## 2024-10-04 RX ORDER — PROGESTERONE 100 MG/1
100 CAPSULE ORAL DAILY
Qty: 30 CAPSULE | Refills: 11 | Status: SHIPPED | OUTPATIENT
Start: 2024-10-04 | End: 2025-10-04

## 2024-10-04 ASSESSMENT — ENCOUNTER SYMPTOMS
SORE THROAT: 0
HYPERACTIVE: 1
FEVER: 0
VOMITING: 0
CONSTIPATION: 0
HEADACHES: 0
DYSURIA: 0
UNEXPECTED WEIGHT CHANGE: 0
BLOOD IN STOOL: 0
WHEEZING: 0
ACTIVITY CHANGE: 0
COUGH: 0
MYALGIAS: 0
ARTHRALGIAS: 0
DIZZINESS: 0
DYSPHORIC MOOD: 0
NERVOUS/ANXIOUS: 1
DIARRHEA: 0
HEMATURIA: 0
JOINT SWELLING: 0
EYE ITCHING: 0
SHORTNESS OF BREATH: 0
ABDOMINAL PAIN: 0
PALPITATIONS: 0
SINUS PRESSURE: 0
NUMBNESS: 0
RHINORRHEA: 0
EYE DISCHARGE: 0
NAUSEA: 0
LIGHT-HEADEDNESS: 0
WEAKNESS: 0
APPETITE CHANGE: 0
FLANK PAIN: 0

## 2024-10-04 NOTE — PROGRESS NOTES
Subjective   Patient ID: Mariah Cole is a 49 y.o. female who presents for Anxiety (Scales completed) and Discuss hormones.    Anxiety  Symptoms include nervous/anxious behavior. Patient reports no chest pain, dizziness, nausea, palpitations or shortness of breath.            Review of Systems   Constitutional:  Negative for activity change, appetite change, fever and unexpected weight change.   HENT:  Negative for congestion, ear pain, postnasal drip, rhinorrhea, sinus pressure and sore throat.    Eyes:  Negative for discharge, itching and visual disturbance.   Respiratory:  Negative for cough, shortness of breath and wheezing.    Cardiovascular:  Negative for chest pain, palpitations and leg swelling.   Gastrointestinal:  Negative for abdominal pain, blood in stool, constipation, diarrhea, nausea and vomiting.   Endocrine: Negative for cold intolerance, heat intolerance and polyuria.   Genitourinary:  Negative for dysuria, flank pain and hematuria.        HOT FLASHES  ARE BACK NOW THAT SHE HAS STOPPED THE CYCLIC HORMONAL THERAPY   NO BLEEDING OR  SPOTTING   NORMAL MAMMOGRAM    Musculoskeletal:  Negative for arthralgias, gait problem, joint swelling and myalgias.   Skin:  Negative for rash.   Allergic/Immunologic: Negative for environmental allergies and food allergies.   Neurological:  Negative for dizziness, syncope, weakness, light-headedness, numbness and headaches.   Psychiatric/Behavioral:  Positive for sleep disturbance. Negative for dysphoric mood. The patient is nervous/anxious and is hyperactive.        Objective   /68   Pulse 92   Temp 36.6 °C (97.8 °F)   Wt 68.9 kg (152 lb)   SpO2 96%   BMI 24.91 kg/m²     Physical Exam  Vitals and nursing note reviewed.   Constitutional:       Appearance: Normal appearance.   HENT:      Head: Normocephalic.      Mouth/Throat:      Mouth: Mucous membranes are moist.   Cardiovascular:      Rate and Rhythm: Normal rate and regular rhythm.      Pulses: Normal  "pulses.      Heart sounds: Normal heart sounds. No murmur heard.     No friction rub. No gallop.   Pulmonary:      Effort: Pulmonary effort is normal. No respiratory distress.      Breath sounds: Normal breath sounds. No wheezing.   Abdominal:      General: Bowel sounds are normal. There is no distension.      Palpations: Abdomen is soft.      Tenderness: There is no abdominal tenderness.   Musculoskeletal:         General: No deformity. Normal range of motion.   Skin:     General: Skin is warm and dry.      Capillary Refill: Capillary refill takes less than 2 seconds.   Neurological:      General: No focal deficit present.      Mental Status: She is alert and oriented to person, place, and time.   Psychiatric:         Mood and Affect: Mood normal.      Comments: STEFAN 7 IS 7  PHQ IS 7  \"THERES ALWAYS SOMETHING!\"           Assessment/Plan   Problem List Items Addressed This Visit             ICD-10-CM    Anxiety and depression F41.9, F32.A    Perimenopausal symptoms - Primary N95.1    Relevant Medications    progesterone (Prometrium) 100 mg capsule    estradiol (Estrace) 0.5 mg tablet          "

## 2024-10-04 NOTE — PATIENT INSTRUCTIONS
The combination hormones have been stopped   Started estrace and progesterone   HOPEFULLY THIS COMBINATION WILL HELP YOU WITH YOUR SYMPTOMS AND NOT CAUSE SIDE  EFFECTS

## 2024-10-07 ASSESSMENT — ENCOUNTER SYMPTOMS: SLEEP DISTURBANCE: 1

## 2024-10-29 DIAGNOSIS — F90.2 ATTENTION DEFICIT HYPERACTIVITY DISORDER (ADHD), COMBINED TYPE: ICD-10-CM

## 2024-10-29 RX ORDER — BUPROPION HYDROCHLORIDE 150 MG/1
150 TABLET ORAL EVERY MORNING
Qty: 30 TABLET | Refills: 5 | Status: SHIPPED | OUTPATIENT
Start: 2024-10-29 | End: 2025-04-27

## 2025-01-21 DIAGNOSIS — F32.A ANXIETY AND DEPRESSION: ICD-10-CM

## 2025-01-21 DIAGNOSIS — F41.9 ANXIETY AND DEPRESSION: ICD-10-CM

## 2025-01-22 RX ORDER — VENLAFAXINE HYDROCHLORIDE 75 MG/1
75 CAPSULE, EXTENDED RELEASE ORAL DAILY
Qty: 30 CAPSULE | Refills: 3 | Status: SHIPPED | OUTPATIENT
Start: 2025-01-22 | End: 2025-05-22

## 2025-04-15 DIAGNOSIS — F90.2 ATTENTION DEFICIT HYPERACTIVITY DISORDER (ADHD), COMBINED TYPE: ICD-10-CM

## 2025-04-16 RX ORDER — BUPROPION HYDROCHLORIDE 150 MG/1
150 TABLET ORAL EVERY MORNING
Qty: 30 TABLET | Refills: 5 | Status: SHIPPED | OUTPATIENT
Start: 2025-04-16 | End: 2025-10-13

## 2025-05-13 DIAGNOSIS — F41.9 ANXIETY AND DEPRESSION: ICD-10-CM

## 2025-05-13 DIAGNOSIS — F32.A ANXIETY AND DEPRESSION: ICD-10-CM

## 2025-05-13 RX ORDER — VENLAFAXINE HYDROCHLORIDE 75 MG/1
75 CAPSULE, EXTENDED RELEASE ORAL DAILY
Qty: 30 CAPSULE | Refills: 3 | Status: SHIPPED | OUTPATIENT
Start: 2025-05-13 | End: 2025-05-14 | Stop reason: SDUPTHER

## 2025-05-14 ENCOUNTER — APPOINTMENT (OUTPATIENT)
Dept: PRIMARY CARE | Facility: CLINIC | Age: 50
End: 2025-05-14
Payer: MEDICAID

## 2025-05-14 VITALS
OXYGEN SATURATION: 99 % | SYSTOLIC BLOOD PRESSURE: 130 MMHG | TEMPERATURE: 97.6 F | DIASTOLIC BLOOD PRESSURE: 72 MMHG | HEIGHT: 66 IN | HEART RATE: 106 BPM | BODY MASS INDEX: 26.89 KG/M2 | WEIGHT: 167.33 LBS

## 2025-05-14 DIAGNOSIS — N95.1 PERIMENOPAUSAL SYMPTOMS: ICD-10-CM

## 2025-05-14 DIAGNOSIS — N93.9 ABNORMAL UTERINE BLEEDING: ICD-10-CM

## 2025-05-14 DIAGNOSIS — F32.A ANXIETY AND DEPRESSION: Primary | ICD-10-CM

## 2025-05-14 DIAGNOSIS — R73.9 HYPERGLYCEMIA: ICD-10-CM

## 2025-05-14 DIAGNOSIS — Z12.31 ENCOUNTER FOR SCREENING MAMMOGRAM FOR MALIGNANT NEOPLASM OF BREAST: ICD-10-CM

## 2025-05-14 DIAGNOSIS — F41.9 ANXIETY AND DEPRESSION: Primary | ICD-10-CM

## 2025-05-14 PROCEDURE — 3008F BODY MASS INDEX DOCD: CPT | Performed by: FAMILY MEDICINE

## 2025-05-14 PROCEDURE — 99214 OFFICE O/P EST MOD 30 MIN: CPT | Performed by: FAMILY MEDICINE

## 2025-05-14 RX ORDER — VENLAFAXINE HYDROCHLORIDE 37.5 MG/1
37.5 CAPSULE, EXTENDED RELEASE ORAL DAILY
Qty: 180 CAPSULE | Refills: 3 | Status: SHIPPED | OUTPATIENT
Start: 2025-05-14 | End: 2025-09-11

## 2025-05-14 ASSESSMENT — ENCOUNTER SYMPTOMS
NERVOUS/ANXIOUS: 0
SINUS PRESSURE: 0
FLANK PAIN: 0
NUMBNESS: 0
DIZZINESS: 0
WHEEZING: 0
ARTHRALGIAS: 0
SORE THROAT: 0
ACTIVITY CHANGE: 0
HEADACHES: 0
FEVER: 0
CONSTIPATION: 0
UNEXPECTED WEIGHT CHANGE: 0
APPETITE CHANGE: 0
SHORTNESS OF BREATH: 0
HEMATURIA: 0
NAUSEA: 0
VOMITING: 0
WEAKNESS: 0
EYE ITCHING: 0
COUGH: 0
RHINORRHEA: 0
DYSPHORIC MOOD: 1
EYE DISCHARGE: 0
MYALGIAS: 0
BLOOD IN STOOL: 0
SLEEP DISTURBANCE: 0
JOINT SWELLING: 0
LIGHT-HEADEDNESS: 0
PALPITATIONS: 0
DIARRHEA: 0
DYSURIA: 0
ABDOMINAL PAIN: 0

## 2025-05-14 ASSESSMENT — PATIENT HEALTH QUESTIONNAIRE - PHQ9
2. FEELING DOWN, DEPRESSED OR HOPELESS: NOT AT ALL
SUM OF ALL RESPONSES TO PHQ9 QUESTIONS 1 AND 2: 0
1. LITTLE INTEREST OR PLEASURE IN DOING THINGS: NOT AT ALL

## 2025-05-14 ASSESSMENT — PROMIS GLOBAL HEALTH SCALE
RATE_AVERAGE_PAIN: 0
RATE_GENERAL_HEALTH: GOOD
RATE_QUALITY_OF_LIFE: GOOD
RATE_AVERAGE_FATIGUE: MILD
CARRYOUT_PHYSICAL_ACTIVITIES: COMPLETELY
RATE_PHYSICAL_HEALTH: GOOD
RATE_MENTAL_HEALTH: GOOD
RATE_SOCIAL_SATISFACTION: GOOD
EMOTIONAL_PROBLEMS: SOMETIMES
CARRYOUT_SOCIAL_ACTIVITIES: GOOD

## 2025-05-14 NOTE — PATIENT INSTRUCTIONS
IF BLEEDING CONTINUES, CONSIDER THE ENDOMETRIAL BIOPSY AND ABLATION   MAMMOGRAM IN AUGUST   GET LABS NOW, PERHAPS AT  AT THE [facility] OR [facility]

## 2025-05-14 NOTE — PROGRESS NOTES
"Subjective   Patient ID: Mariah Cole is a 50 y.o. female who presents for Annual Exam (YEARLY).  HPI SLEEPING BETTER   STOPPED DHEA CAUSED ACNE AND PERHAPS CAUSED BLEEDING   TWO PERIODS NOW   MAY NEED ENDOMETRIAL BIOPSY       Review of Systems   Constitutional:  Negative for activity change, appetite change, fever and unexpected weight change.   HENT:  Negative for congestion, ear pain, postnasal drip, rhinorrhea, sinus pressure and sore throat.    Eyes:  Negative for discharge, itching and visual disturbance.   Respiratory:  Negative for cough, shortness of breath and wheezing.    Cardiovascular:  Negative for chest pain, palpitations and leg swelling.   Gastrointestinal:  Negative for abdominal pain, blood in stool, constipation, diarrhea, nausea and vomiting.   Endocrine: Negative for cold intolerance, heat intolerance and polyuria.   Genitourinary:  Positive for menstrual problem. Negative for dysuria, flank pain and hematuria.        SEVERE PERIMENOPAUSAL NIGHT SWEATS AND POOR SLEEP   Musculoskeletal:  Negative for arthralgias, gait problem, joint swelling and myalgias.   Skin:  Negative for rash.   Allergic/Immunologic: Negative for environmental allergies and food allergies.   Neurological:  Negative for dizziness, syncope, weakness, light-headedness, numbness and headaches.   Psychiatric/Behavioral:  Positive for dysphoric mood. Negative for sleep disturbance. The patient is not nervous/anxious.            5/29/2024    10:38 AM 6/26/2024    10:20 AM 7/26/2024     9:53 AM 8/23/2024    10:34 AM 8/30/2024     3:29 PM 10/4/2024     3:39 PM 5/14/2025     8:09 AM   Vitals   Systolic 126 114 120 112 120 106 130   Diastolic 70 62 60 68 62 68 72   Heart Rate 61 81 72 89 116 92 106   Temp 35.8 °C (96.5 °F) 36.4 °C (97.6 °F) 36.8 °C (98.2 °F) 36.2 °C (97.1 °F) 36.3 °C (97.4 °F) 36.6 °C (97.8 °F) 36.4 °C (97.6 °F)   Height       1.664 m (5' 5.5\")   Weight (lb) 148 148 150 152 152 152 167.33   BMI 24.25 kg/m2 24.25 " kg/m2 24.58 kg/m2 24.91 kg/m2 24.91 kg/m2 24.91 kg/m2 27.42 kg/m2   BSA (m2) 1.76 m2 1.76 m2 1.77 m2 1.78 m2 1.78 m2 1.78 m2 1.87 m2   Visit Report Report Report Report Report Report Report Report       Body mass index is 27.42 kg/m².      Objective   Physical Exam  Vitals and nursing note reviewed.   Constitutional:       Appearance: Normal appearance.   HENT:      Head: Normocephalic.   Cardiovascular:      Rate and Rhythm: Normal rate and regular rhythm.      Pulses: Normal pulses.      Heart sounds: Normal heart sounds. No murmur heard.     No friction rub. No gallop.   Pulmonary:      Effort: Pulmonary effort is normal. No respiratory distress.      Breath sounds: Normal breath sounds. No wheezing.   Abdominal:      General: There is no distension.      Tenderness: There is no abdominal tenderness.   Musculoskeletal:         General: No deformity. Normal range of motion.   Skin:     General: Skin is warm and dry.      Capillary Refill: Capillary refill takes less than 2 seconds.   Neurological:      General: No focal deficit present.      Mental Status: She is alert and oriented to person, place, and time.   Psychiatric:         Mood and Affect: Mood normal.         Assessment/Plan DISCUSSED THESE PERIMENOPAUSAL SYMPTOMS AND POTENTIAL NEED FOR ENDOMETRIAL BIOPSY  Problem List Items Addressed This Visit       Anxiety and depression - Primary    Relevant Medications    venlafaxine XR (Effexor-XR) 37.5 mg 24 hr capsule    Encounter for screening mammogram for malignant neoplasm of breast    Relevant Orders    BI mammo bilateral screening tomosynthesis    Perimenopausal symptoms     Other Visit Diagnoses         Hyperglycemia        Relevant Orders    Comprehensive Metabolic Panel    Lipid Panel    TSH with reflex to Free T4 if abnormal    Hemoglobin A1C      Abnormal uterine bleeding        Relevant Orders    CBC and Auto Differential               Melissa Raman DO

## 2025-09-01 DIAGNOSIS — N95.1 PERIMENOPAUSAL SYMPTOMS: ICD-10-CM

## 2025-09-02 RX ORDER — PROGESTERONE 100 MG/1
100 CAPSULE ORAL DAILY
Qty: 30 CAPSULE | Refills: 11 | Status: SHIPPED | OUTPATIENT
Start: 2025-09-02

## 2025-09-02 RX ORDER — ESTRADIOL 0.5 MG/1
0.5 TABLET ORAL DAILY
Qty: 30 TABLET | Refills: 11 | Status: SHIPPED | OUTPATIENT
Start: 2025-09-02